# Patient Record
Sex: FEMALE | Race: WHITE | NOT HISPANIC OR LATINO | URBAN - METROPOLITAN AREA
[De-identification: names, ages, dates, MRNs, and addresses within clinical notes are randomized per-mention and may not be internally consistent; named-entity substitution may affect disease eponyms.]

---

## 2024-05-16 NOTE — PROGRESS NOTES
NEW PATIENT    HISTORY OF PRESENT ILLNESS   44 y.o. 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 Her right hip pain is worse than the left. She enjoys working in the yard outside of work. She states that her and her  are currently building a shop and working on an old truck. She states that she was cleaning her yard and pulling on a tree limb. She states that she was pulling hard and pivoting. She begin having extreme pain in the right hip the next day. She had pain trying to sit. She is unable to pull her leg up when the pain flares. She had a right hip arthrogram and had the report for review.     - mechanical symptoms, - instability          PAST MEDICAL HISTORY    History reviewed. No pertinent past medical history.    PAST SURGICAL HISTORY     History reviewed. No pertinent surgical history.    FAMILY HISTORY    No family history on file.    SOCIAL HISTORY    Social History     Socioeconomic History    Marital status:    Tobacco Use    Smoking status: Never    Smokeless tobacco: Never       MEDICATIONS    No current outpatient medications on file.    ALLERGIES     Review of patient's allergies indicates:  No Known Allergies      REVIEW OF SYSTEMS   Constitution: Negative. Negative for chills, fever and night sweats.   HENT: Negative for congestion and headaches.    Eyes: Negative for blurred vision, left vision loss and right vision loss.   Cardiovascular: Negative for chest pain and syncope.   Respiratory: Negative for cough and shortness of breath.    Endocrine: Negative for polydipsia, polyphagia and polyuria.   Hematologic/Lymphatic: Negative for bleeding problem. Does not bruise/bleed easily.   Skin: Negative for dry skin, itching and rash.   Musculoskeletal: Negative for falls. Positive for bilateral hip pain.  Gastrointestinal: Negative for abdominal pain and bowel incontinence.   Genitourinary: Negative for bladder incontinence and nocturia.  "  Neurological: Negative for disturbances in coordination, loss of balance and seizures.   Psychiatric/Behavioral: Negative for depression. The patient does not have insomnia.    Allergic/Immunologic: Negative for hives and persistent infections.     PHYSICAL EXAMINATION    Vitals: /87   Pulse 72   Ht 5' 11.5" (1.816 m)     General: The patient appears active and healthy with no apparent physical problems.  No disturbance of mood or affect is demonstrated. Alert and Oriented.    HEENT: Eyes normal, pupils equally round, nose normal.    Resp: Equal and symmetrical chest rises. No wheezing    CV: Regular rate    Neck: Supple; nonpainful range of motion.    Extremities: no cyanosis, clubbing, edema, or diffuse swelling.  Palpable pulses, good capillary refill of the digits.  No coolness, discoloration, edema or obvious varicosities.    Skin: no lesions noted.    Lymphatic: no detected adenopathy in the upper or lower extremities.    Neurologic: normal mental status, normal reflexes, normal gait and balance.  Patient is alert and oriented to person, place and time.  No flaccidity or spasticity is noted.  No motor or sensory deficits are noted.  Light touch is intact    Orthopaedic: Hip Exam- RIGHT    Inspection: Normal skin color and appearance, no ecchymosis, no swelling, and no scars.  Pelvis is level  without tilt. Leg lengths are equal.     Palpation: There is no tenderness of the anterior superior iliac spine, iliac crest, pubic symphysis, posterior superior iliac spine, sacroiliac joint, and greater trochanter.      ROM:   Flexion 100°, extension 20°, adduction 20°, abduction 40°, internal rotation 30°, external rotation 45°.  Pain is absent with ROM testing.  + fadir    Motor:   Muscle testing is 5/5 hip flexors, 5/5 extensors, 5/5 abductors and 5/5 adductors.      Neuro:   Sensation is normal in anterior, lateral and posterior femoral cutaneous nerve distribution.  Negative Trendelenburg Test. Reflexes " are 2/2 knee and    2/2 ankle.  Straight  leg raise is negative.    Vascular: 2+ pedal pulses, brisk cap refill less than 2 sec.    Generalized hyperlaxity.    IMAGING    X-Ray Hips Bilateral 2 View Incl AP Pelvis  Narrative: EXAMINATION:  XR HIPS BILATERAL 2 VIEW INCL AP PELVIS    CLINICAL HISTORY:  Pain in right hip    TECHNIQUE:  AP view of the pelvis and frogleg lateral views of both hips were performed.    COMPARISON:  None.    FINDINGS:  No acute fractures.  Intact visualized lower lumbar spine and right and left SI joints.  No definite narrowing of the right or left hip joint spaces.  Bilateral acetabular roof spurring.  Preserved right and left femoral head contours.  3-4 mm well-circumscribed lucent lesion suggested in the left greater trochanteric region should relate to small cyst.  5-6 mm well-circumscribed lucent lesion involving the base of the left femoral neck likely small cyst.  If there is clinical concern that these well-circumscribed subcentimeter lucent lesions could represent other pathology, could further evaluate with cross-sectional imaging.  Impression: As above    Electronically signed by: Samm Connors  Date:    05/17/2024  Time:    13:39        IMPRESSION       ICD-10-CM ICD-9-CM   1. Tear of left acetabular labrum, initial encounter  S73.192A 843.8   2. Tear of right acetabular labrum, initial encounter  S73.191A 843.8   3. Right hip pain  M25.551 719.45       MEDICATIONS PRESCRIBED      None    RECOMMENDATIONS     Physical therapy  RTC in 2 weeks with MRI images  We discussed possible injection options.  I recommended against PRP at this time until we had a firm diagnosis.  I also discussed potentially a diagnostic intra-articular hip injection.  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  However she is asymptomatic in the left hip at this time.  Her symptoms are primarily in the right hip.  She has never had  pre-existing issues or injuries prior.  I recommended a conservative approach with a core strengthening program 1st followed by potential hip injection.  I did recommend anti-inflammatories however she declined and wanted to start 1st with physical therapy.      All questions were answered, pt will contact us for questions or concerns in the interim.

## 2024-05-17 ENCOUNTER — OFFICE VISIT (OUTPATIENT)
Dept: SPORTS MEDICINE | Facility: CLINIC | Age: 44
End: 2024-05-17
Payer: COMMERCIAL

## 2024-05-17 ENCOUNTER — HOSPITAL ENCOUNTER (OUTPATIENT)
Dept: RADIOLOGY | Facility: HOSPITAL | Age: 44
Discharge: HOME | End: 2024-05-17
Attending: ORTHOPAEDIC SURGERY
Payer: COMMERCIAL

## 2024-05-17 VITALS — HEIGHT: 72 IN | DIASTOLIC BLOOD PRESSURE: 87 MMHG | SYSTOLIC BLOOD PRESSURE: 127 MMHG | HEART RATE: 72 BPM

## 2024-05-17 DIAGNOSIS — M25.551 RIGHT HIP PAIN: ICD-10-CM

## 2024-05-17 DIAGNOSIS — S73.191A TEAR OF RIGHT ACETABULAR LABRUM, INITIAL ENCOUNTER: ICD-10-CM

## 2024-05-17 DIAGNOSIS — S73.192A TEAR OF LEFT ACETABULAR LABRUM, INITIAL ENCOUNTER: Primary | ICD-10-CM

## 2024-05-17 PROCEDURE — 3074F SYST BP LT 130 MM HG: CPT | Mod: CPTII,S$GLB,, | Performed by: ORTHOPAEDIC SURGERY

## 2024-05-17 PROCEDURE — 73521 X-RAY EXAM HIPS BI 2 VIEWS: CPT | Mod: TC

## 2024-05-17 PROCEDURE — 99204 OFFICE O/P NEW MOD 45 MIN: CPT | Mod: S$GLB,,, | Performed by: ORTHOPAEDIC SURGERY

## 2024-05-17 PROCEDURE — 73521 X-RAY EXAM HIPS BI 2 VIEWS: CPT | Mod: 26,,, | Performed by: RADIOLOGY

## 2024-05-17 PROCEDURE — 3079F DIAST BP 80-89 MM HG: CPT | Mod: CPTII,S$GLB,, | Performed by: ORTHOPAEDIC SURGERY

## 2024-05-17 PROCEDURE — 99999 PR PBB SHADOW E&M-NEW PATIENT-LVL III: CPT | Mod: PBBFAC,,, | Performed by: ORTHOPAEDIC SURGERY

## 2024-05-22 ENCOUNTER — PATIENT MESSAGE (OUTPATIENT)
Dept: SPORTS MEDICINE | Facility: CLINIC | Age: 44
End: 2024-05-22
Payer: COMMERCIAL

## 2024-05-28 ENCOUNTER — CLINICAL SUPPORT (OUTPATIENT)
Dept: REHABILITATION | Facility: HOSPITAL | Age: 44
End: 2024-05-28
Attending: ORTHOPAEDIC SURGERY
Payer: COMMERCIAL

## 2024-05-28 DIAGNOSIS — M25.551 RIGHT HIP PAIN: ICD-10-CM

## 2024-05-28 PROCEDURE — 97110 THERAPEUTIC EXERCISES: CPT

## 2024-05-28 PROCEDURE — 97161 PT EVAL LOW COMPLEX 20 MIN: CPT

## 2024-05-28 NOTE — PLAN OF CARE
[facility]   Physical Therapy Initial Evaluation      Name: Kary Marquez  Clinic Number: [id number]    Therapy Diagnosis:   Encounter Diagnosis   Name Primary?    Right hip pain         Physician: Kamla Moreira MD    Physician Orders: PT Eval and Treat   Medical Diagnosis from Referral: Right hip pain  Evaluation Date: 5/28/2024  Authorization Period Expiration: 05/29/2024 - 12/31/2024  Plan of Care Expiration: 8/28/24  Progress Note Due: 6/28/24  Visit # / Visits authorized: 1 / 1    FOTO: 22%  FOTO 2:   FOTO 3:  DC FOTO @: 56%      Precautions: Standard     Time In: 200 pm   Time Out: 300 pm   Total Appointment Time (timed & untimed codes): 60 minutes    Subjective     Date of onset: right before christmas 2023    History of current condition - Kary reports: severe right hip pain that would get so bad she couldn't sit down or put shoes on, etc. She does note that she could have onset the worsening by doing some gardening in which she was dragging tree limps, which is along the lines of when the pain worsened. Over the years there has been stiffness in joints throughout. The hip pain is inhibiting walking, movement, adls, and exercise. Dr. Moreira told her to hold off running but that hip replacements were in the future. He says more so DJD but does feel like there are labrum tears. He recommended cycling, strengthening, and doing weights at this point with conservative PT. Follow up with  on 7th where they will discuss MRI. ADLs are difficult and painful. Pain is full [place] of hip joint. Has been doing about 20 min of low level exercise.     Falls: none    Imaging:  XRAY (B HIP): No acute fractures. Intact visualized lower lumbar spine and right and left SI joints. No definite narrowing of the right or left hip joint spaces. Bilateral acetabular roof spurring. Preserved right and left femoral head contours. 3-4 mm well-circumscribed lucent lesion suggested in the  left greater trochanteric region should relate to small cyst. 5-6 mm well-circumscribed lucent lesion involving the base of the left femoral neck likely small cyst. If there is clinical concern that these well-circumscribed subcentimeter lucent lesions could represent other pathology, could further evaluate with cross-sectional imaging.     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    Surgical History:   Kary Marquez  has no past surgical history on file.    Medications:   Kary currently has no medications in their medication list.    Allergies:   Review of patient's allergies indicates:  No Known Allergies     Objective      Observation: tall 44 year old female    Hip Range of Motion:   Right active Left active    Flexion 98 122   Abduction 65 65   Extension 20 20   Ext. Rotation 40 26   Int. Rotation 19 32       Lower Extremity Strength   Right LE Left LE   Quadriceps: 4/5 4+/5   Hamstrings: 4+/5 4+/5   Iliopsoas: 4+/5 4+/5   Hip extension:  3+/5 3+/5   PGM: 3+/5 4/5   Hip ER: 3/5 4/5   Hip IR: 3/5 3/5       Special Tests:   FABERs:  +   AMY:+  Hip Scour: +  Slump: -    Core lift off strength for 30 sec: able but shaking    Joint Mobility: lateral/inferior glide hypomobile    Palpation: not tender to touch    Edema: none noticeable      Limitation/Restriction for FOTO HIP Survey    Therapist reviewed FOTO scores for Kary Marquez on 5/28/2024.   FOTO documents entered into [employer] - see Media section.    Limitation Score: 22%    "      Treatment     Total Treatment time (time-based codes) separate from Evaluation: 10 minutes     Kary received the treatments listed below:      Plank / side plank 1 x 30"  Reverse clam 10x  TRA march 10x  Prone alt LE 10x    Patient Education and Home Exercises     Education provided:   - HEP    Written Home Exercises Provided: yes. Exercises were reviewed and Kary was able to demonstrate them prior to the end of the session.  Kary demonstrated good  understanding of the education provided. See EMR under Patient Instructions for exercises provided during therapy sessions.    Assessment     Kary is a 44 y.o. female referred to outpatient Physical Therapy with a medical diagnosis of Right hip pain. Patient presents with sx/symptoms of labral pathology in R>L hip. She has had MRI but results are not in chart. Pt demonstrating decreased hip ROM, joint mobility, and pain. Pt has f/u with Dr. Moreira next Friday the 7th.    Patient prognosis is Good.   Patient will benefit from skilled outpatient Physical Therapy to address the deficits stated above and in the chart below, provide patient /family education, and to maximize patientt's level of independence.     Plan of care discussed with patient: Yes  Patient's spiritual, cultural and educational needs considered and patient is agreeable to the plan of care and goals as stated below:     Anticipated Barriers for therapy: (none)    Medical Necessity is demonstrated by the following  History  Co-morbidities and personal factors that may impact the plan of care [x] LOW: no personal factors / co-morbidities  [] MODERATE: 1-2 personal factors / co-morbidities  [] HIGH: 3+ personal factors / co-morbidities    Moderate / High Support Documentation:   Co-morbidities affecting plan of care: see chart    Personal Factors:   no deficits     Examination  Body Structures and Functions, activity limitations and participation restrictions that may impact the plan " of care [x] LOW: addressing 1-2 elements  [] MODERATE: 3+ elements  [] HIGH: 4+ elements (please support below)    Moderate / High Support Documentation:      Clinical Presentation [x] LOW: stable  [] MODERATE: Evolving  [] HIGH: Unstable     Decision Making/ Complexity Score: low       Goals:  Short Term Goals:  4 weeks  1.Report decreased R hip pain  < / =  8/10  to increase tolerance for adls  2. Increase ROM by R HIP degrees where limited in order to perform ADLs without difficulty.  3. Increase strength by 1/3 MMT grade in R HIP  to increase tolerance for ADL and work activities.  4. Pt to tolerate HEP to improve ROM and independence with ADL's    Long Term Goals: 8 weeks  1.Report decreased R HIP pain < / = 2/10  to increase tolerance for ADLS  2.Patient goal: walk pain-free  3.Increase strength to 4+/5 in  RLE  to increase tolerance for ADL and work activities.  4. Pt will report at CJ level (20-40% impaired) on LEFS  to demonstrate increase in LE function with every day tasks.       Plan     Plan of care Certification: 5/28/2024 to 8/28/24.    Outpatient Physical Therapy 2 times weekly for 10 weeks to include the following interventions: Gait Training, Manual Therapy, Moist Heat/ Ice, Neuromuscular Re-ed, Patient Education, Self Care, Therapeutic Activities, Therapeutic Exercise,  dry needling.     Nan Damon, PT

## 2024-06-04 NOTE — PROGRESS NOTES
"ESTABLISHED PATIENT    History 6/7/2024:  Kary returns today for follow-up MR arthrogram of her hip.    History 5/17/2024:  44 y.o. 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 She enjoys working in the yard outside of work. She states that her and her  are currently building a shop and working on an old truck. She states that she was cleaning her yard and pulling on a tree limb. She states that she was pulling hard and pivoting. She begin having extreme pain in the right hip the next day. She had pain trying to sit. She is unable to pull her leg up when the pain flares. She had a right hip arthrogram and had the report for review.     - mechanical symptoms, - instability    REVIEW OF SYSTEMS   Constitution: Negative. Negative for chills, fever and night sweats.   HENT: Negative for congestion and headaches.    Eyes: Negative for blurred vision, left vision loss and right vision loss.   Cardiovascular: Negative for chest pain and syncope.   Respiratory: Negative for cough and shortness of breath.    Endocrine: Negative for polydipsia, polyphagia and polyuria.   Hematologic/Lymphatic: Negative for bleeding problem. Does not bruise/bleed easily.   Skin: Negative for dry skin, itching and rash.   Musculoskeletal: Negative for falls. Positive for bilateral hip pain.  Gastrointestinal: Negative for abdominal pain and bowel incontinence.   Genitourinary: Negative for bladder incontinence and nocturia.   Neurological: Negative for disturbances in coordination, loss of balance and seizures.   Psychiatric/Behavioral: Negative for depression. The patient does not have insomnia.    Allergic/Immunologic: Negative for hives and persistent infections.     PHYSICAL EXAMINATION    Vitals: /84   Pulse 80   Ht 5' 11" (1.803 m)   Wt 72.1 kg (159 lb)   BMI 22.18 kg/m²     General: The patient appears active and healthy with no " apparent physical problems.  No disturbance of mood or affect is demonstrated. Alert and Oriented.    HEENT: Eyes normal, pupils equally round, nose normal.    Resp: Equal and symmetrical chest rises. No wheezing    CV: Regular rate    Neck: Supple; nonpainful range of motion.    Extremities: no cyanosis, clubbing, edema, or diffuse swelling.  Palpable pulses, good capillary refill of the digits.  No coolness, discoloration, edema or obvious varicosities.    Skin: no lesions noted.    Lymphatic: no detected adenopathy in the upper or lower extremities.    Neurologic: normal mental status, normal reflexes, normal gait and balance.  Patient is alert and oriented to person, place and time.  No flaccidity or spasticity is noted.  No motor or sensory deficits are noted.  Light touch is intact    Orthopaedic: Hip Exam- RIGHT    Inspection: Normal skin color and appearance, no ecchymosis, no swelling, and no scars.  Pelvis is level  without tilt. Leg lengths are equal.     Palpation: There is no tenderness of the anterior superior iliac spine, iliac crest, pubic symphysis, posterior superior iliac spine, sacroiliac joint, and greater trochanter.      ROM:   Flexion 100°, extension 20°, adduction 20°, abduction 40°, internal rotation 30°, external rotation 45°.  Pain is absent with ROM testing.  + fadir    Motor:   Muscle testing is 5/5 hip flexors, 5/5 extensors, 5/5 abductors and 5/5 adductors.      Neuro:   Sensation is normal in anterior, lateral and posterior femoral cutaneous nerve distribution.  Negative Trendelenburg Test. Reflexes are 2/2 knee and    2/2 ankle.  Straight  leg raise is negative.    Vascular: 2+ pedal pulses, brisk cap refill less than 2 sec.    Generalized hyperlaxity.    IMAGING    X-Ray Hips Bilateral 2 View Incl AP Pelvis  Narrative: EXAMINATION:  XR HIPS BILATERAL 2 VIEW INCL AP PELVIS    CLINICAL HISTORY:  Pain in right hip    TECHNIQUE:  AP view of the pelvis and frogleg lateral views of both  hips were performed.    COMPARISON:  None.    FINDINGS:  No acute fractures.  Intact visualized lower lumbar spine and right and left SI joints.  No definite narrowing of the right or left hip joint spaces.  Bilateral acetabular roof spurring.  Preserved right and left femoral head contours.  3-4 mm well-circumscribed lucent lesion suggested in the left greater trochanteric region should relate to small cyst.  5-6 mm well-circumscribed lucent lesion involving the base of the left femoral neck likely small cyst.  If there is clinical concern that these well-circumscribed subcentimeter lucent lesions could represent other pathology, could further evaluate with cross-sectional imaging.  Impression: As above    Electronically signed by: Samm Connors  Date:    05/17/2024  Time:    13:39        IMPRESSION       ICD-10-CM ICD-9-CM   1. Tear of right acetabular labrum, initial encounter  S73.191A 843.8   2. Tear of left acetabular labrum, initial encounter  S73.192A 843.8         MEDICATIONS PRESCRIBED      None    RECOMMENDATIONS     Referr to Dr. Villalta for possible hip arthroscopy  We discussed surgical and nonsurgical treatment.  I recommended a possible surgical consultation.  She is still in physical therapy which I have recommended her to continue.      All questions were answered, pt will contact us for questions or concerns in the interim.

## 2024-06-06 ENCOUNTER — CLINICAL SUPPORT (OUTPATIENT)
Dept: REHABILITATION | Facility: HOSPITAL | Age: 44
End: 2024-06-06
Payer: COMMERCIAL

## 2024-06-06 DIAGNOSIS — M25.551 RIGHT HIP PAIN: Primary | ICD-10-CM

## 2024-06-06 PROCEDURE — 97112 NEUROMUSCULAR REEDUCATION: CPT

## 2024-06-06 PROCEDURE — 97140 MANUAL THERAPY 1/> REGIONS: CPT

## 2024-06-06 NOTE — PROGRESS NOTES
"[facility]   Physical Therapy Treatment Note      Name: Kary Marquez  Clinic Number: [id number]    Therapy Diagnosis: No diagnosis found.  Physician: Kamla Moreira MD    Visit Date: 6/6/2024    Physician: Kamla Moreira MD     Physician Orders: PT Eval and Treat             Medical Diagnosis from Referral: Right hip pain  Evaluation Date: 5/28/2024  Authorization Period Expiration: 05/29/2024 - 12/31/2024  Plan of Care Expiration: 8/28/24  Progress Note Due: 6/28/24  Visit # / Visits authorized: 1 / 1     FOTO: 22%  FOTO 2:   FOTO 3:  DC FOTO @: 56%        Precautions: Standard     PTA Visit #: 0/5     Time In: 305 pm   Time Out: 400 pm   Total Billable Time: 55 minutes    Subjective     Pt reports: during exercises she feels her joints rather than the muscles. Cannot do exercises in the morning due to severe stiffness. It takes her until the end of day to warm up and be able to get through them. Nothing is worse. In high pain today 7/10. Sees dr. Meijer tomorrow to review results.     She was compliant with home exercise program.  Response to previous treatment: no worse  Functional change: no worsening     Pain: 7/10  Location: R hip    Objective      Objective Measures updated at progress report unless specified.     Hip Range of Motion:    Right active Left active    Flexion 98 122   Abduction 65 65   Extension 20 20   Ext. Rotation 40 26   Int. Rotation 19 32       Treatment     Kary received the treatments listed below:      Kary participated in neuromuscular re-education activities to improve: Balance, Coordination, Kinesthetic, Sense, Proprioception and Posture for 45 minutes. The following activities were included:  HSystem hip hinge kneeling 30x  B Bentover hip ext 3 x 10  B modified wedge bridge 3" x 30  B hip add/abd ball and band 3" x 40  Isometric seated IR 3" x 30  Isometric IR/ER 3" orange belt 3 x 8    Kary received the following manual " therapy techniques: Joint mobilizations, Manual traction, Myofacial release, Soft tissue Mobilization, Friction Massage and Functional Dry Needling were applied for 10 minutes, including:  Long and short axis distraction of R hip  Lateral / inferior glides of R hip    Patient Education and Home Exercises       Education provided:   - continue HEP     Written Home Exercises Provided: Patient instructed to cont prior HEP. Exercises were reviewed and Kary was able to demonstrate them prior to the end of the session.  Kary demonstrated good  understanding of the education provided. See EMR under Patient Instructions for exercises provided during therapy sessions    Assessment     Pt in 7/10 pain today.  She had some joint pain with HEP - we made modifications and found activation with isometrics was better. Updated HEP issued band. Going to see Dr. Moreira for MRI Review tomorrow morning and will stop by afterwards to discuss POC.    Kary Is progressing well towards her goals.   Pt prognosis is Fair.     Pt will continue to benefit from skilled outpatient physical therapy to address the deficits listed in the problem list box on initial evaluation, provide pt/family education and to maximize pt's level of independence in the home and community environment.     Pt's spiritual, cultural and educational needs considered and pt agreeable to plan of care and goals.     Anticipated barriers to physical therapy: none    Goals:   Short Term Goals:  4 weeks  1.Report decreased R hip pain  < / =  8/10  to increase tolerance for adls  2. Increase ROM by R HIP degrees where limited in order to perform ADLs without difficulty.  3. Increase strength by 1/3 MMT grade in R HIP  to increase tolerance for ADL and work activities.  4. Pt to tolerate HEP to improve ROM and independence with ADL's     Long Term Goals: 8 weeks  1.Report decreased R HIP pain < / = 2/10  to increase tolerance for ADLS  2.Patient goal: walk  pain-free  3.Increase strength to 4+/5 in  RLE  to increase tolerance for ADL and work activities.  4. Pt will report at CJ level (20-40% impaired) on LEFS  to demonstrate increase in LE function with every day tasks.     Plan     Continue with PT DIANA Damon PT

## 2024-06-07 ENCOUNTER — OFFICE VISIT (OUTPATIENT)
Dept: SPORTS MEDICINE | Facility: CLINIC | Age: 44
End: 2024-06-07
Payer: COMMERCIAL

## 2024-06-07 VITALS
DIASTOLIC BLOOD PRESSURE: 84 MMHG | HEART RATE: 80 BPM | WEIGHT: 159 LBS | SYSTOLIC BLOOD PRESSURE: 123 MMHG | HEIGHT: 71 IN | BODY MASS INDEX: 22.26 KG/M2

## 2024-06-07 DIAGNOSIS — S73.192A TEAR OF LEFT ACETABULAR LABRUM, INITIAL ENCOUNTER: ICD-10-CM

## 2024-06-07 DIAGNOSIS — S73.191A TEAR OF RIGHT ACETABULAR LABRUM, INITIAL ENCOUNTER: Primary | ICD-10-CM

## 2024-06-07 PROCEDURE — 3079F DIAST BP 80-89 MM HG: CPT | Mod: CPTII,S$GLB,, | Performed by: ORTHOPAEDIC SURGERY

## 2024-06-07 PROCEDURE — 3008F BODY MASS INDEX DOCD: CPT | Mod: CPTII,S$GLB,, | Performed by: ORTHOPAEDIC SURGERY

## 2024-06-07 PROCEDURE — 99214 OFFICE O/P EST MOD 30 MIN: CPT | Mod: S$GLB,,, | Performed by: ORTHOPAEDIC SURGERY

## 2024-06-07 PROCEDURE — 1159F MED LIST DOCD IN RCRD: CPT | Mod: CPTII,S$GLB,, | Performed by: ORTHOPAEDIC SURGERY

## 2024-06-07 PROCEDURE — 99999 PR PBB SHADOW E&M-EST. PATIENT-LVL III: CPT | Mod: PBBFAC,,, | Performed by: ORTHOPAEDIC SURGERY

## 2024-06-07 PROCEDURE — 3074F SYST BP LT 130 MM HG: CPT | Mod: CPTII,S$GLB,, | Performed by: ORTHOPAEDIC SURGERY

## 2024-06-11 ENCOUNTER — CLINICAL SUPPORT (OUTPATIENT)
Dept: REHABILITATION | Facility: HOSPITAL | Age: 44
End: 2024-06-11
Payer: COMMERCIAL

## 2024-06-11 DIAGNOSIS — M25.551 RIGHT HIP PAIN: Primary | ICD-10-CM

## 2024-06-11 PROCEDURE — 97530 THERAPEUTIC ACTIVITIES: CPT

## 2024-06-11 PROCEDURE — 97112 NEUROMUSCULAR REEDUCATION: CPT

## 2024-06-11 PROCEDURE — 97140 MANUAL THERAPY 1/> REGIONS: CPT

## 2024-06-11 NOTE — PROGRESS NOTES
"[facility]   Physical Therapy Treatment Note      Name: Kary Marquez  Clinic Number: [id number]    Therapy Diagnosis: No diagnosis found.  Physician: Kamla Moreira MD    Visit Date: 6/11/2024    Physician: Kamla Moreira MD     Physician Orders: PT Eval and Treat             Medical Diagnosis from Referral: Right hip pain  Evaluation Date: 5/28/2024  Authorization Period Expiration: 05/29/2024 - 12/31/2024  Plan of Care Expiration: 8/28/24  Progress Note Due: 6/28/24  Visit # / Visits authorized: 2 / 20     FOTO: 22%  FOTO 2:   FOTO 3:  DC FOTO @: 56%        Precautions: Standard     PTA Visit #: 0/5     Time In: 900 am   Time Out: 1000 am   Total Billable Time: 60 minutes    Subjective     Pt reports[place] also had time to relax so not in as much pain. Not at a 7 today. She is to see Dr. Villalta in 1 week regarding her MRI and options based on that.     She was compliant with home exercise program.  Response to previous treatment: no worse  Functional change: no worsening     Pain: not stated/10  Location: R hip    Objective      Objective Measures updated at progress report unless specified.     Hip Range of Motion:    Right active Left active    Flexion 98 122   Abduction 65 65   Extension 20 20   Ext. Rotation 40 26   Int. Rotation 19 32       Treatment     Kary received the treatments listed below:      Kary participated in neuromuscular re-education activities to improve: Balance, Coordination, Kinesthetic, Sense, Proprioception and Posture for 35 minutes. The following activities were included:  [place] hip hinge kneeling 30x  R Bentover hip ext 3 x 10  B modified wedge bridge 3" x 30  B hip add/abd ball and band 3" x 40  Weighted R hip abd 3# 3 x 10     Not Today:   Isometric seated IR 3" x 30  Isometric IR/ER 3" orange belt 3 x 8    Kary received the following manual therapy techniques: Joint mobilizations, Manual " traction, Myofacial release, Soft tissue Mobilization, Friction Massage and Functional Dry Needling were applied for 10 minutes, including:  Long and short axis distraction of R hip  Lateral / inferior glides of R hip      Kary participated in dynamic functional therapeutic activities to improve functional performance for 15 minutes, including:  Knee ext hammer curl 5# 4 x 8  SL leg press 60# 3 x 8  Banded mod squats BTB at knees 3 x 8      Patient Education and Home Exercises       Education provided:   - continue HEP     Written Home Exercises Provided: Patient instructed to cont prior HEP. Exercises were reviewed and Kary was able to demonstrate them prior to the end of the session.  Kary demonstrated good  understanding of the education provided. See EMR under Patient Instructions for exercises provided during therapy sessions    Assessment     Pt with improved symptoms. Responding well to isometrics without symptoms. We were able to progress a bit today and focused on strengthening around hip joint for improved stability. Sees Dr. Villalta in 1 week for consult.    Kary Is progressing well towards her goals.   Pt prognosis is Fair.     Pt will continue to benefit from skilled outpatient physical therapy to address the deficits listed in the problem list box on initial evaluation, provide pt/family education and to maximize pt's level of independence in the home and community environment.     Pt's spiritual, cultural and educational needs considered and pt agreeable to plan of care and goals.     Anticipated barriers to physical therapy: none    Goals:   Short Term Goals:  4 weeks  1.Report decreased R hip pain  < / =  8/10  to increase tolerance for adls  2. Increase ROM by R HIP degrees where limited in order to perform ADLs without difficulty.  3. Increase strength by 1/3 MMT grade in R HIP  to increase tolerance for ADL and work activities.  4. Pt to tolerate HEP to improve ROM and  independence with ADL's     Long Term Goals: 8 weeks  1.Report decreased R HIP pain < / = 2/10  to increase tolerance for ADLS  2.Patient goal: walk pain-free  3.Increase strength to 4+/5 in  RLE  to increase tolerance for ADL and work activities.  4. Pt will report at CJ level (20-40% impaired) on LEFS  to demonstrate increase in LE function with every day tasks.     Plan     Continue with PT DIANA Damon, PT

## 2024-06-18 ENCOUNTER — HOSPITAL ENCOUNTER (OUTPATIENT)
Dept: RADIOLOGY | Facility: HOSPITAL | Age: 44
Discharge: HOME | End: 2024-06-18
Attending: STUDENT IN AN ORGANIZED HEALTH CARE EDUCATION/TRAINING PROGRAM
Payer: COMMERCIAL

## 2024-06-18 ENCOUNTER — OFFICE VISIT (OUTPATIENT)
Dept: SPORTS MEDICINE | Facility: CLINIC | Age: 44
End: 2024-06-18
Payer: COMMERCIAL

## 2024-06-18 VITALS
HEART RATE: 67 BPM | SYSTOLIC BLOOD PRESSURE: 107 MMHG | DIASTOLIC BLOOD PRESSURE: 73 MMHG | HEIGHT: 71 IN | BODY MASS INDEX: 22.61 KG/M2 | WEIGHT: 161.5 LBS

## 2024-06-18 DIAGNOSIS — M25.852 FEMOROACETABULAR IMPINGEMENT OF BOTH HIPS: Primary | ICD-10-CM

## 2024-06-18 DIAGNOSIS — S73.191A ACETABULAR LABRUM TEAR, RIGHT, INITIAL ENCOUNTER: ICD-10-CM

## 2024-06-18 DIAGNOSIS — M25.851 FEMOROACETABULAR IMPINGEMENT OF BOTH HIPS: Primary | ICD-10-CM

## 2024-06-18 DIAGNOSIS — M25.559 HIP PAIN, UNSPECIFIED LATERALITY: ICD-10-CM

## 2024-06-18 PROCEDURE — 3074F SYST BP LT 130 MM HG: CPT | Mod: CPTII,S$GLB,, | Performed by: STUDENT IN AN ORGANIZED HEALTH CARE EDUCATION/TRAINING PROGRAM

## 2024-06-18 PROCEDURE — 73521 X-RAY EXAM HIPS BI 2 VIEWS: CPT | Mod: TC

## 2024-06-18 PROCEDURE — 99999 PR PBB SHADOW E&M-EST. PATIENT-LVL III: CPT | Mod: PBBFAC,,, | Performed by: STUDENT IN AN ORGANIZED HEALTH CARE EDUCATION/TRAINING PROGRAM

## 2024-06-18 PROCEDURE — 1159F MED LIST DOCD IN RCRD: CPT | Mod: CPTII,S$GLB,, | Performed by: STUDENT IN AN ORGANIZED HEALTH CARE EDUCATION/TRAINING PROGRAM

## 2024-06-18 PROCEDURE — 99214 OFFICE O/P EST MOD 30 MIN: CPT | Mod: S$GLB,,, | Performed by: STUDENT IN AN ORGANIZED HEALTH CARE EDUCATION/TRAINING PROGRAM

## 2024-06-18 PROCEDURE — 1160F RVW MEDS BY RX/DR IN RCRD: CPT | Mod: CPTII,S$GLB,, | Performed by: STUDENT IN AN ORGANIZED HEALTH CARE EDUCATION/TRAINING PROGRAM

## 2024-06-18 PROCEDURE — 3078F DIAST BP <80 MM HG: CPT | Mod: CPTII,S$GLB,, | Performed by: STUDENT IN AN ORGANIZED HEALTH CARE EDUCATION/TRAINING PROGRAM

## 2024-06-18 PROCEDURE — 73521 X-RAY EXAM HIPS BI 2 VIEWS: CPT | Mod: 26,,, | Performed by: RADIOLOGY

## 2024-06-18 PROCEDURE — 3008F BODY MASS INDEX DOCD: CPT | Mod: CPTII,S$GLB,, | Performed by: STUDENT IN AN ORGANIZED HEALTH CARE EDUCATION/TRAINING PROGRAM

## 2024-06-18 NOTE — PROGRESS NOTES
Subjective:          Chief Complaint: Kary Marquez is a 44 y.o. female who had concerns including Pain of the Right Hip and Pain of the Left Hip.    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 She notes that her right is worse then the left.   She notes that her pain started about 6 months ago with no specific injury. She locates her pain as anterior, lateral, and posterior surrounding the hip. She complains of increased pain with activity and long periods of sitting.  She is difficulty getting in and out of a car going from sit to stand position due to the pain.  At times, the pain reaches a 7/10.  She has begun some formal physical therapy and has been to 3 sessions.      History reviewed. No pertinent past medical history.    No current outpatient medications on file prior to visit.     No current facility-administered medications on file prior to visit.       History reviewed. No pertinent surgical history.    No family history on file.    Social History     Socioeconomic History    Marital status:    Tobacco Use    Smoking status: Never    Smokeless tobacco: Never   Social History Narrative    ** Merged History Encounter **          Social Determinants of Health     Financial Resource Strain: Low Risk  (6/3/2024)    Overall Financial Resource Strain (CARDIA)     Difficulty of Paying Living Expenses: Not hard at all   Food Insecurity: No Food Insecurity (6/3/2024)    Hunger Vital Sign     Worried About Running Out of Food in the Last Year: Never true     Ran Out of Food in the Last Year: Never true   Physical Activity: Insufficiently Active (6/3/2024)    Exercise Vital Sign     Days of Exercise per Week: 5 days     Minutes of Exercise per Session: 20 min   Stress: No Stress Concern Present (6/3/2024)    [employer] - Occupational Stress Questionnaire     Feeling of  Stress : Not at all   Housing Stability: Unknown (6/3/2024)    Housing Stability Vital Sign     Unable to Pay for Housing in the Last Year: No       Review of Systems   Constitutional: Negative.   HENT: Negative.     Eyes: Negative.    Cardiovascular: Negative.    Respiratory: Negative.     Endocrine: Negative.    Hematologic/Lymphatic: Negative.    Skin: Negative.    Musculoskeletal:  Positive for joint pain (bilateral hip). Negative for arthritis, falls, muscle cramps, muscle weakness, myalgias and stiffness.   Neurological: Negative.    Psychiatric/Behavioral: Negative.     Allergic/Immunologic: Negative.                    Objective:        General: Kary is well-developed, well-nourished, appears stated age, in no acute distress, alert and oriented to time, place and person.     General    Nursing note and vitals reviewed.  Constitutional: She is oriented to person, place, and time. She appears well-developed and well-nourished. No distress.   HENT:   Head: Normocephalic and atraumatic.   Nose: Nose normal.   Eyes: EOM are normal.   Cardiovascular:  Intact distal pulses.            Pulmonary/Chest: Effort normal. No respiratory distress.   Neurological: She is alert and oriented to person, place, and time.   Psychiatric: She has a normal mood and affect. Her behavior is normal. Judgment and thought content normal.           Right Knee Exam     Inspection   Alignment:  normal  Effusion: absent    Left Knee Exam     Inspection   Alignment:  normal  Effusion: absent    Right Hip Exam     Inspection   Scars: absent  Swelling: absent  Bruising: absent  No deformity of hip.  Quadriceps Atrophy:  Negative  Erythema: absent    Range of Motion   Extension:  0   Flexion:  100   External rotation:  60   Internal rotation:  10     Tests   Pain w/ forced internal rotation (KANE): absent  Pain w/ forced external rotation (FADIR): present  Niranjan: negative  Trendelenburg Test: negative  Circumduction test:  negative  Stinchfield test: positive  Log Roll: negative  Snapping Hip (internal): negative  Step-down test: negative    Other   Sensation: normal  Left Hip Exam     Inspection   Scars: absent  Swelling: absent  No deformity of hip.  Quadriceps Atrophy:  negative  Erythema: absent  Bruising: absent    Range of Motion   Extension:  0   Flexion:  110   External rotation:  60   Internal rotation: 20     Tests   Pain w/ forced internal rotation (KANE): absent  Pain w/ forced external rotation (FADIR): absent  Niranjan: negative  Trendelenburg Test: negative  Circumduction test: negative  Stinchfield test: negative  Log Roll: negative    Other   Sensation: normal          Muscle Strength   Right Lower Extremity   Hip Abduction: 5/5   Hip Adduction: 5/5   Hip Flexion: 4/5   Ankle Dorsiflexion:  5/5   Left Lower Extremity   Hip Abduction: 5/5   Hip Adduction: 5/5   Hip Flexion: 5/5   Ankle Dorsiflexion:  5/5     Vascular Exam     Right Pulses  Dorsalis Pedis:      2+  Posterior Tibial:      2+        Left Pulses  Dorsalis Pedis:      2+  Posterior Tibial:      2+        Capillary Refill  Left Hand: normal capillary refill        Edema  Right Upper Leg: absent  Left Upper Leg: absent      Imaging:   X-rays of the bilateral hips from 06/18/2024 personally viewed by me on that day these include AP pelvis, AP right hip, AP left hip, bilateral modified [place].  Large cam deformities bilaterally as well as pincer impingement with likely os acetabuli.      MR arthrogram of the right hip from 01/26/2024 at an outside institution uploaded and personally viewed by me 06/18/2024.  There is complex tearing of the acetabular labrum on the right hip include the anterior, superior, and posterior aspect.  There is calcification within the anterior and superior aspect of the labrum.  Cam impingement.  Cartilage preserved.        Assessment:     Kary Marquez is a 44 y.o. female With bilateral femoroacetabular impingement and right  labral tear  Encounter Diagnoses   Name Primary?    Hip pain, unspecified laterality     Femoroacetabular impingement of both hips Yes    Acetabular labrum tear, right, initial encounter           Plan:       Diagnosis treatment options with the patient all her questions were answered I showed her the x-rays and the MR arthrogram and reviewed the findings with her.  She is only just begun formal physical therapy for this.  I recommended we continue the hip, core, gluteal strengthening.  I did offer referral for an intra-articular corticosteroid injection to help with her hip pain.  This was declined at this time.  We will continue physical therapy she will return to clinic in 6-8 weeks.  If her symptoms worsen, we can consider intra-articular corticosteroid injection or surgical intervention would be hip arthroscopy with osteoplasty of the femur, acetabulum, and labral repair versus reconstruction.

## 2024-06-20 ENCOUNTER — CLINICAL SUPPORT (OUTPATIENT)
Dept: REHABILITATION | Facility: HOSPITAL | Age: 44
End: 2024-06-20
Payer: COMMERCIAL

## 2024-06-20 DIAGNOSIS — M25.551 RIGHT HIP PAIN: Primary | ICD-10-CM

## 2024-06-20 PROCEDURE — 97530 THERAPEUTIC ACTIVITIES: CPT

## 2024-06-20 PROCEDURE — 97140 MANUAL THERAPY 1/> REGIONS: CPT

## 2024-06-20 PROCEDURE — 97112 NEUROMUSCULAR REEDUCATION: CPT

## 2024-06-20 NOTE — PROGRESS NOTES
"[facility]   Physical Therapy Treatment Note      Name: Kary Marquez  Clinic Number: [id number]    Therapy Diagnosis: No diagnosis found.  Physician: Kamla Moreira MD    Visit Date: 6/20/2024    Physician: Kamla Moreira MD     Physician Orders: PT Eval and Treat             Medical Diagnosis from Referral: Right hip pain  Evaluation Date: 5/28/2024  Authorization Period Expiration: 05/29/2024 - 12/31/2024  Plan of Care Expiration: 8/28/24  Progress Note Due: 6/28/24  Visit # / Visits authorized: 3 / 20     FOTO: 22%  FOTO 2:   FOTO 3:  DC FOTO @: 56%        Precautions: Standard     PTA Visit #: 0/5     Time In: 400 pm   Time Out: 455 pm   Total Billable Time: 55 minutes    Subjective     Pt reports: saw Dr. Villalta recommended CSI/cont with PT for another 6-8 weeks. Pt is hurting today (7/10)    She was compliant with home exercise program.  Response to previous treatment: no worse  Functional change: no worsening     Pain: 7/10  Location: R hip    Objective      Objective Measures updated at progress report unless specified.     Hip Range of Motion:    Right active Left active    Flexion 98 122   Abduction 65 65   Extension 20 20   Ext. Rotation 40 26   Int. Rotation 19 32       Treatment     Kary received the treatments listed below:      Kary participated in neuromuscular re-education activities to improve: Balance, Coordination, Kinesthetic, Sense, Proprioception and Posture for 25 minutes. The following activities were included:  [place] hip hinge kneeling 20x  Bird dog L beginner 15x   LAQ nhan orange belt 10" x 15       Not Today:   Isometric seated IR 3" x 30  Isometric IR/ER 3" orange belt 3 x 8  B hip add/abd ball and band 3" x 40  Weighted R hip abd 3# 3 x 10  R Bentover hip ext 3 x 10    Kary received the following manual therapy techniques: Joint mobilizations, Manual traction, Myofacial release, Soft tissue Mobilization, Friction Massage " "and Functional Dry Needling were applied for 15 minutes, including:  Long and short axis distraction of R hip  Lateral / inferior glides of R hip      Kary participated in dynamic functional therapeutic activities to improve functional performance for 15 minutes, including:  Knee ext hammer curl 5#  ~ crepitus   -- > ALIDA quad orange belt 10" x 10  SL leg press 40# 4 x 8    Patient Education and Home Exercises       Education provided:   - continue HEP     Written Home Exercises Provided: Patient instructed to cont prior HEP. Exercises were reviewed and Kary was able to demonstrate them prior to the end of the session.  Kary demonstrated good  understanding of the education provided. See EMR under Patient Instructions for exercises provided during therapy sessions    Assessment     Dr. Villalta wants her to continue with PT. She was displaying some crepitus with LAQs today so we modified to extension isometrics for patellar tracking, likely secondary to lumbopelvic and glute/quad weakness. Updated HEP.    Kary Is progressing well towards her goals.   Pt prognosis is Fair.     Pt will continue to benefit from skilled outpatient physical therapy to address the deficits listed in the problem list box on initial evaluation, provide pt/family education and to maximize pt's level of independence in the home and community environment.     Pt's spiritual, cultural and educational needs considered and pt agreeable to plan of care and goals.     Anticipated barriers to physical therapy: none    Goals:   Short Term Goals:  4 weeks  1.Report decreased R hip pain  < / =  8/10  to increase tolerance for adls  2. Increase ROM by R HIP degrees where limited in order to perform ADLs without difficulty.  3. Increase strength by 1/3 MMT grade in R HIP  to increase tolerance for ADL and work activities.  4. Pt to tolerate HEP to improve ROM and independence with ADL's     Long Term Goals: 8 weeks  1.Report decreased " R HIP pain < / = 2/10  to increase tolerance for ADLS  2.Patient goal: walk pain-free  3.Increase strength to 4+/5 in  RLE  to increase tolerance for ADL and work activities.  4. Pt will report at CJ level (20-40% impaired) on LEFS  to demonstrate increase in LE function with every day tasks.     Plan     Continue with PT DIANA Damon, PT

## 2024-06-27 ENCOUNTER — CLINICAL SUPPORT (OUTPATIENT)
Dept: REHABILITATION | Facility: HOSPITAL | Age: 44
End: 2024-06-27
Payer: COMMERCIAL

## 2024-06-27 DIAGNOSIS — M25.551 RIGHT HIP PAIN: Primary | ICD-10-CM

## 2024-06-27 PROCEDURE — 97140 MANUAL THERAPY 1/> REGIONS: CPT

## 2024-06-27 PROCEDURE — 97112 NEUROMUSCULAR REEDUCATION: CPT

## 2024-06-27 NOTE — PROGRESS NOTES
"[facility]   Physical Therapy Treatment Note      Name: Kary Marquez  Clinic Number: [id number]    Therapy Diagnosis:   Encounter Diagnosis   Name Primary?    Right hip pain Yes     Physician: Kamla Moreira MD    Visit Date: 6/27/2024    Physician: Kamla Moreira MD     Physician Orders: PT Eval and Treat             Medical Diagnosis from Referral: Right hip pain  Evaluation Date: 5/28/2024  Authorization Period Expiration: 05/29/2024 - 12/31/2024  Plan of Care Expiration: 8/28/24  Progress Note Due: 6/28/24  Visit # / Visits authorized: 4 / 20     FOTO: 22%  FOTO 2:   FOTO 3:  DC FOTO @: 56%        Precautions: Standard     PTA Visit #: 0/5     Time In: 400 pm   Time Out: 455 pm   Total Billable Time: 55 minutes    Subjective     Pt reports: 6/10 today. She was having some extra right knee pain that resolved after she eliminated the isometrics.    She was compliant with home exercise program.  Response to previous treatment: no worse  Functional change: no worsening     Pain: 6/10  Location: R hip    Objective      Objective Measures updated at progress report unless specified.     Hip Range of Motion:    Right active Left active    Flexion 98 122   Abduction 65 65   Extension 20 20   Ext. Rotation 40 26   Int. Rotation 19 32       Treatment     Kary received the treatments listed below:      Kary participated in neuromuscular re-education activities to improve: Balance, Coordination, Kinesthetic, Sense, Proprioception and Posture for 40 minutes. The following activities were included:  [place] hip hinge kneeling 20x  Bird dog 2 x 20   QP Hip hike 3 x 15  FALL OUT UE/E 2 x 20  ER clam BTB 3" 3 x 10  Isometrics hip abd ball against wall 5" x 10 each    Not Today:   Isometric seated IR 3" x 30  Isometric IR/ER 3" orange belt 3 x 8  B hip add/abd ball and band 3" x 40  Weighted R hip abd 3# 3 x 10  R Bentover hip ext 3 x 10    Kary received the following " manual therapy techniques: Joint mobilizations, Manual traction, Myofacial release, Soft tissue Mobilization, Friction Massage and Functional Dry Needling were applied for 15 minutes, including:  Long and short axis distraction of R hip  Lateral / inferior glides of R hip      Patient Education and Home Exercises       Education provided:   - continue HEP     Written Home Exercises Provided: Patient instructed to cont prior HEP. Exercises were reviewed and Kary was able to demonstrate them prior to the end of the session.  Kary demonstrated good  understanding of the education provided. See EMR under Patient Instructions for exercises provided during therapy sessions    Assessment     Continued with core and hip isolation exercises that are both pain-free and feel productive to patient. Added to HEP. Assess response next visit.    Kary Is progressing well towards her goals.   Pt prognosis is Fair.     Pt will continue to benefit from skilled outpatient physical therapy to address the deficits listed in the problem list box on initial evaluation, provide pt/family education and to maximize pt's level of independence in the home and community environment.     Pt's spiritual, cultural and educational needs considered and pt agreeable to plan of care and goals.     Anticipated barriers to physical therapy: none    Goals:   Short Term Goals:  4 weeks  1.Report decreased R hip pain  < / =  8/10  to increase tolerance for adls  2. Increase ROM by R HIP degrees where limited in order to perform ADLs without difficulty.  3. Increase strength by 1/3 MMT grade in R HIP  to increase tolerance for ADL and work activities.  4. Pt to tolerate HEP to improve ROM and independence with ADL's     Long Term Goals: 8 weeks  1.Report decreased R HIP pain < / = 2/10  to increase tolerance for ADLS  2.Patient goal: walk pain-free  3.Increase strength to 4+/5 in  RLE  to increase tolerance for ADL and work activities.  4. Pt  will report at CJ level (20-40% impaired) on LEFS  to demonstrate increase in LE function with every day tasks.     Plan     Continue with PT DIANA Damon, PT

## 2024-07-03 ENCOUNTER — CLINICAL SUPPORT (OUTPATIENT)
Dept: REHABILITATION | Facility: HOSPITAL | Age: 44
End: 2024-07-03
Payer: COMMERCIAL

## 2024-07-03 DIAGNOSIS — M25.551 RIGHT HIP PAIN: Primary | ICD-10-CM

## 2024-07-03 PROCEDURE — 97112 NEUROMUSCULAR REEDUCATION: CPT

## 2024-07-03 PROCEDURE — 97530 THERAPEUTIC ACTIVITIES: CPT

## 2024-07-03 PROCEDURE — 97140 MANUAL THERAPY 1/> REGIONS: CPT

## 2024-07-03 NOTE — PROGRESS NOTES
"[facility]   Physical Therapy Treatment Note      Name: Kary Marquez  Clinic Number: [id number]    Therapy Diagnosis:   No diagnosis found.    Physician: Kamla Moreira MD    Visit Date: 7/3/2024    Physician: Kamla Moreira MD     Physician Orders: PT Eval and Treat             Medical Diagnosis from Referral: Right hip pain  Evaluation Date: 5/28/2024  Authorization Period Expiration: 05/29/2024 - 12/31/2024  Plan of Care Expiration: 8/28/24  Progress Note Due: 6/28/24  Visit # / Visits authorized: 5 / 20     FOTO: 22%  FOTO 2:   FOTO 3:  DC FOTO @: 56%        Precautions: Standard     PTA Visit #: 0/5     Time In: 800 pm   Time Out: 900 pm   Total Billable Time: 60 minutes    Subjective     Pt reports: 1/10 this morning. No adverse symptoms to last session    She was compliant with home exercise program.  Response to previous treatment: no worse  Functional change: no worsening     Pain: 1/10  Location: R hip    Objective      Objective Measures updated at progress report unless specified.     Hip Range of Motion:    Right active Left active    Flexion 98 122   Abduction 65 65   Extension 20 20   Ext. Rotation 40 26   Int. Rotation 19 32       Treatment     Kary received the treatments listed below:      Kary participated in neuromuscular re-education activities to improve: Balance, Coordination, Kinesthetic, Sense, Proprioception and Posture for 30 minutes. The following activities were included:  [employer] hip hinge kneeling 20x  Bird dog 2 x 20   QP Hip hike 3 x 15  FALL OUT UE/E 2 x 20   Side plank ER GTB 3" 3 x 10  B Hip IR GTB/ball 3" 3 x 10  Banded bridge 3" x 30 GTB   PRONE SWIMMERS 2 x 20  Weighted R hip abd 3# 3 x 10    Kary participated in dynamic functional therapeutic activities to improve functional performance for 15 minutes, including:  Shuttle squats 75# 4 x 8   Lateral walks GTB @ ankles 3 x 10    Not Today:   Isometric seated IR 3" x " [facility] Progress Note    Subjective:     MARIETTA Arora has been doing well and feeding well. Objective:     Estimated Gestational Age: Gestational Age: 36w7d    Weight: 2.41 kg(Filed from Delivery Summary)      Intake and Output:    No intake/output data recorded. No intake/output data recorded. Patient Vitals for the past 24 hrs:   Urine Occurrence(s)   19 1     Patient Vitals for the past 24 hrs:   Stool Occurrence(s)   19 1              Pulse 135, temperature 98.1 °F (36.7 °C), resp. rate 44, height 0.47 m, weight 2.41 kg, head circumference 31.5 cm. Physical Exam:    General: healthy-appearing, vigorous infant. Strong cry. Head: sutures lines are open,fontanelles soft, flat and open  Eyes: sclerae white, pupils equal and reactive, red reflex normal bilaterally  Ears: well-positioned, well-formed pinnae  Nose: clear, normal mucosa  Mouth: Normal tongue, palate intact,  Neck: normal structure  Chest: lungs clear to auscultation, unlabored breathing, no clavicular crepitus  Heart: RRR, S1 S2, no murmurs  Abd: Soft, non-tender, no masses, no HSM, nondistended, umbilical stump clean and dry  Pulses: strong equal femoral pulses, brisk capillary refill  Hips: Negative Cagle, Ortolani, gluteal creases equal  : Normal genitalia  Extremities: well-perfused, warm and dry  Neuro: easily aroused  Good symmetric tone and strength  Positive root and suck.   Symmetric normal reflexes  Skin: warm and pink    Labs:    Recent Results (from the past 24 hour(s))   GLUCOSE, POC    Collection Time: 19 11:34 PM   Result Value Ref Range    Glucose (POC) 36 (LL) 50 - 110 mg/dL    Performed by Itz Sommer, POC    Collection Time: 19  3:23 AM   Result Value Ref Range    Glucose (POC) 35 (LL) 50 - 110 mg/dL    Performed by Marck PÉREZ    GLUCOSE, POC    Collection Time: 19  7:07 AM   Result Value Ref Range    Glucose (POC) 38 (LL) 50 - 110 mg/dL "30  Isometric IR/ER 3" orange belt 3 x 8  B hip add/abd ball and band 3" x 40  R Bentover hip ext 3 x 10    Kary received the following manual therapy techniques: Joint mobilizations, Manual traction, Myofacial release, Soft tissue Mobilization, Friction Massage and Functional Dry Needling were applied for 15 minutes, including:  Long and short axis distraction of R hip  Lateral / inferior glides of R hip      Patient Education and Home Exercises       Education provided:   - continue HEP     Written Home Exercises Provided: Patient instructed to cont prior HEP. Exercises were reviewed and Kary was able to demonstrate them prior to the end of the session.  Kary demonstrated good  understanding of the education provided. See EMR under Patient Instructions for exercises provided during therapy sessions    Assessment     Pt presented with low level pain (this may have something to do with it being a morning session. Regardless, she tolerated all progressions without increased symptoms. Continued with core and hip isolation exercises that are both pain-free and feel productive to patient. Assess response next visit.    Kary Is progressing well towards her goals.   Pt prognosis is Fair.     Pt will continue to benefit from skilled outpatient physical therapy to address the deficits listed in the problem list box on initial evaluation, provide pt/family education and to maximize pt's level of independence in the home and community environment.     Pt's spiritual, cultural and educational needs considered and pt agreeable to plan of care and goals.     Anticipated barriers to physical therapy: none    Goals:   Short Term Goals:  4 weeks  1.Report decreased R hip pain  < / =  8/10  to increase tolerance for adls  2. Increase ROM by R HIP degrees where limited in order to perform ADLs without difficulty.  3. Increase strength by 1/3 MMT grade in R HIP  to increase tolerance for ADL and work " Performed by Chasity PÉREZ        Assessment:     Patient Active Problem List   Diagnosis Code    Liveborn infant by  delivery Z38.01     infant P80.30    Breech birth O27. 1XX0    Small for dates infant P0.11       Plan:     Continue routine care.     Signed By:  Ana Coffey MD     2019 activities.  4. Pt to tolerate HEP to improve ROM and independence with ADL's     Long Term Goals: 8 weeks  1.Report decreased R HIP pain < / = 2/10  to increase tolerance for ADLS  2.Patient goal: walk pain-free  3.Increase strength to 4+/5 in  RLE  to increase tolerance for ADL and work activities.  4. Pt will report at CJ level (20-40% impaired) on LEFS  to demonstrate increase in LE function with every day tasks.     Plan     Continue with PT DIANA Damon PT

## 2024-07-12 ENCOUNTER — CLINICAL SUPPORT (OUTPATIENT)
Dept: REHABILITATION | Facility: HOSPITAL | Age: 44
End: 2024-07-12
Payer: COMMERCIAL

## 2024-07-12 DIAGNOSIS — M25.551 RIGHT HIP PAIN: Primary | ICD-10-CM

## 2024-07-12 PROCEDURE — 97140 MANUAL THERAPY 1/> REGIONS: CPT

## 2024-07-12 PROCEDURE — 97112 NEUROMUSCULAR REEDUCATION: CPT

## 2024-07-12 PROCEDURE — 97530 THERAPEUTIC ACTIVITIES: CPT

## 2024-07-12 NOTE — PROGRESS NOTES
"[facility]   Physical Therapy Treatment Note      Name: Kary Marquez  Clinic Number: [id number]    Therapy Diagnosis:   No diagnosis found.    Physician: Kamla Moreira MD    Visit Date: 7/12/2024    Physician: Kamla Moreira MD     Physician Orders: PT Eval and Treat             Medical Diagnosis from Referral: Right hip pain  Evaluation Date: 5/28/2024  Authorization Period Expiration: 05/29/2024 - 12/31/2024  Plan of Care Expiration: 8/28/24  Progress Note Due: 6/28/24  Visit # / Visits authorized: 6 / 20     FOTO: 22%  FOTO 2:   FOTO 3:   DC FOTO @: 56%        Precautions: Standard     PTA Visit #: 0/5     Time In: 1250 pm   Time Out: 145 pm   Total Billable Time: 55 minutes    Subjective     Pt reports: 5/10 this morning. Feels like it is better, can now put on her shoes and socks.    She was compliant with home exercise program.  Response to previous treatment: no worse  Functional change: no worsening     Pain: 1/10  Location: R hip    Objective      Objective Measures updated at progress report unless specified.     Hip Range of Motion:    Right active Left active    Flexion 98 122   Abduction 65 65   Extension 20 20   Ext. Rotation 40 26   Int. Rotation 19 32       Treatment     Kary received the treatments listed below:      Kary participated in neuromuscular re-education activities to improve: Balance, Coordination, Kinesthetic, Sense, Proprioception and Posture for 25 minutes. The following activities were included:  B Hip IR GTB/ball 3" 3 x 10  Banded bridge 3" x 30 BTB   Donkey kicks 3 x 10  Hydrants 3 x 10    Not Today:   Bird dog 2 x 20   QP Hip hike 3 x 15  FALL OUT UE/E 2 x 20   Side plank ER GTB 3" 3 x 10  PRONE SWIMMERS 2 x 20  Weighted R hip abd 3# 3 x 10    Kary participated in dynamic functional therapeutic activities to improve functional performance for 20 minutes, including:  Shuttle squats 75# 4 x 8   Lateral shuttle 25# 4 x 8 " "      Not Today:   Isometric seated IR 3" x 30  Isometric IR/ER 3" orange belt 3 x 8  B hip add/abd ball and band 3" x 40  R Bentover hip ext 3 x 10    Kary received the following manual therapy techniques: Joint mobilizations, Manual traction, Myofacial release, Soft tissue Mobilization, Friction Massage and Functional Dry Needling were applied for 15 minutes, including:  Long and short axis distraction of R hip  Lateral / inferior glides of R hip      Patient Education and Home Exercises       Education provided:   - continue HEP     Written Home Exercises Provided: Patient instructed to cont prior HEP. Exercises were reviewed and Kary was able to demonstrate them prior to the end of the session.  Kary demonstrated good  understanding of the education provided. See EMR under Patient Instructions for exercises provided during therapy sessions    Assessment     Pt progressing and in less pain overall. Going out of town and returns July 29th.    Kary Is progressing well towards her goals.   Pt prognosis is Fair.     Pt will continue to benefit from skilled outpatient physical therapy to address the deficits listed in the problem list box on initial evaluation, provide pt/family education and to maximize pt's level of independence in the home and community environment.     Pt's spiritual, cultural and educational needs considered and pt agreeable to plan of care and goals.     Anticipated barriers to physical therapy: none    Goals:   Short Term Goals:  4 weeks  1.Report decreased R hip pain  < / =  8/10  to increase tolerance for adls  2. Increase ROM by R HIP degrees where limited in order to perform ADLs without difficulty.  3. Increase strength by 1/3 MMT grade in R HIP  to increase tolerance for ADL and work activities.  4. Pt to tolerate HEP to improve ROM and independence with ADL's     Long Term Goals: 8 weeks  1.Report decreased R HIP pain < / = 2/10  to increase tolerance for " ADLS  2.Patient goal: walk pain-free  3.Increase strength to 4+/5 in  RLE  to increase tolerance for ADL and work activities.  4. Pt will report at CJ level (20-40% impaired) on LEFS  to demonstrate increase in LE function with every day tasks.     Plan     Continue with PT DIANA Damon PT

## 2024-07-29 ENCOUNTER — CLINICAL SUPPORT (OUTPATIENT)
Dept: REHABILITATION | Facility: HOSPITAL | Age: 44
End: 2024-07-29
Payer: COMMERCIAL

## 2024-07-29 DIAGNOSIS — M25.551 RIGHT HIP PAIN: Primary | ICD-10-CM

## 2024-07-29 PROCEDURE — 97112 NEUROMUSCULAR REEDUCATION: CPT

## 2024-07-29 PROCEDURE — 97530 THERAPEUTIC ACTIVITIES: CPT

## 2024-07-29 PROCEDURE — 97140 MANUAL THERAPY 1/> REGIONS: CPT

## 2024-07-29 NOTE — Clinical Note
Nubia Villalta! You see this patient tomorrow at 1115. She is doing so much better painwise, daily. Started at 7/10 and even had no pain last week on vacation. However as soon as she gets back to doing things (work, ADLs) it is a 2/10.  We will continue based on yalls visit tomorrow. She has worked hard. Just wanted to update you before you see her.

## 2024-07-29 NOTE — PROGRESS NOTES
"[facility]   Physical Therapy Treatment Note      Name: Kayr Marquez  Clinic Number: [id number]    Therapy Diagnosis:   Encounter Diagnosis   Name Primary?    Right hip pain Yes       Physician: Kamla Moreira MD    Visit Date: 7/29/2024    Physician: Kamla Moreira MD     Physician Orders: PT Eval and Treat             Medical Diagnosis from Referral: Right hip pain  Evaluation Date: 5/28/2024  Authorization Period Expiration: 05/29/2024 - 12/31/2024  Plan of Care Expiration: 8/28/24  Progress Note Due: 6/28/24  Visit # / Visits authorized: 7 / 20     FOTO: 22%  FOTO 2:   FOTO 3:   DC FOTO @: 56%        Precautions: Standard     PTA Visit #: 0/5     Time In: 700 pm   Time Out: 755 pm   Total Billable Time: 55 minutes    Subjective     Pt reports: no pain on vacation last week at all. Moved offices latter half of last week though and tweaked it a bit so had some pain since then. 2/10 today and sees dr huffman tomorrow.    She was compliant with home exercise program.  Response to previous treatment: no worse  Functional change: no worsening     Pain: 2/10  Location: R hip    Objective      Objective Measures updated at progress report unless specified.     Hip Range of Motion:    Right active Left active    Flexion 98 122   Abduction 65 65   Extension 20 20   Ext. Rotation 40 26   Int. Rotation 19 32       Treatment     Kary received the treatments listed below:      Kary participated in neuromuscular re-education activities to improve: Balance, Coordination, Kinesthetic, Sense, Proprioception and Posture for 31 minutes. The following activities were included:  R hip ER BTB 3" 3 x 10  R side plank hip abd 3 x 10  Banded 2 up 1 down bridge 3" x 30 BTB   Circles in hip abd CW/CCW 6 x 10 each   Circuit RLE:   Hip extension 3 x 10  Donkey kicks 3 x 10  Hydrants 3 x 10      Not Today:   Bird dog 2 x 20   QP Hip hike 3 x 15  FALL OUT UE/E 2 x 20   Side plank ER GTB 3" " 3 x 10  PRONE SWIMMERS 2 x 20  Weighted R hip abd 3# 3 x 10    Kary participated in dynamic functional therapeutic activities to improve functional performance for 12 minutes, including:  SL Shuttle squats 50# 4 x 8   Lateral shuttle 25# 4 x 8       Kary received the following manual therapy techniques: Joint mobilizations, Manual traction, Myofacial release, Soft tissue Mobilization, Friction Massage and Functional Dry Needling were applied for 12 minutes, including:  Long and short axis distraction of R hip  Lateral / inferior glides of R hip      Patient Education and Home Exercises       Education provided:   - continue HEP     Written Home Exercises Provided: Patient instructed to cont prior HEP. Exercises were reviewed and Kary was able to demonstrate them prior to the end of the session.  Kary demonstrated good  understanding of the education provided. See EMR under Patient Instructions for exercises provided during therapy sessions    Assessment     Pt doing significantly better since onset of PT. She even had no pain on vacation. 2/10 pain today and was able to progress hip stability activities.    Kary Is progressing well towards her goals.   Pt prognosis is Fair.     Pt will continue to benefit from skilled outpatient physical therapy to address the deficits listed in the problem list box on initial evaluation, provide pt/family education and to maximize pt's level of independence in the home and community environment.     Pt's spiritual, cultural and educational needs considered and pt agreeable to plan of care and goals.     Anticipated barriers to physical therapy: none    Goals:   Short Term Goals:  4 weeks  1.Report decreased R hip pain  < / =  8/10  to increase tolerance for adls  2. Increase ROM by R HIP degrees where limited in order to perform ADLs without difficulty.  3. Increase strength by 1/3 MMT grade in R HIP  to increase tolerance for ADL and work activities.  4.  Pt to tolerate HEP to improve ROM and independence with ADL's     Long Term Goals: 8 weeks  1.Report decreased R HIP pain < / = 2/10  to increase tolerance for ADLS  2.Patient goal: walk pain-free  3.Increase strength to 4+/5 in  RLE  to increase tolerance for ADL and work activities.  4. Pt will report at CJ level (20-40% impaired) on LEFS  to demonstrate increase in LE function with every day tasks.     Plan     Continue with PT DIANA Damon, PT

## 2024-07-30 ENCOUNTER — PATIENT MESSAGE (OUTPATIENT)
Dept: REHABILITATION | Facility: HOSPITAL | Age: 44
End: 2024-07-30
Payer: COMMERCIAL

## 2024-07-30 ENCOUNTER — OFFICE VISIT (OUTPATIENT)
Dept: SPORTS MEDICINE | Facility: CLINIC | Age: 44
End: 2024-07-30
Payer: COMMERCIAL

## 2024-07-30 VITALS
WEIGHT: 163.25 LBS | HEART RATE: 70 BPM | BODY MASS INDEX: 22.85 KG/M2 | DIASTOLIC BLOOD PRESSURE: 86 MMHG | HEIGHT: 71 IN | SYSTOLIC BLOOD PRESSURE: 124 MMHG

## 2024-07-30 DIAGNOSIS — M25.852 FEMOROACETABULAR IMPINGEMENT OF BOTH HIPS: Primary | ICD-10-CM

## 2024-07-30 DIAGNOSIS — S73.191A ACETABULAR LABRUM TEAR, RIGHT, INITIAL ENCOUNTER: ICD-10-CM

## 2024-07-30 DIAGNOSIS — M25.851 FEMOROACETABULAR IMPINGEMENT OF BOTH HIPS: Primary | ICD-10-CM

## 2024-07-30 PROCEDURE — 1159F MED LIST DOCD IN RCRD: CPT | Mod: CPTII,S$GLB,, | Performed by: STUDENT IN AN ORGANIZED HEALTH CARE EDUCATION/TRAINING PROGRAM

## 2024-07-30 PROCEDURE — 99999 PR PBB SHADOW E&M-EST. PATIENT-LVL III: CPT | Mod: PBBFAC,,, | Performed by: STUDENT IN AN ORGANIZED HEALTH CARE EDUCATION/TRAINING PROGRAM

## 2024-07-30 PROCEDURE — 3079F DIAST BP 80-89 MM HG: CPT | Mod: CPTII,S$GLB,, | Performed by: STUDENT IN AN ORGANIZED HEALTH CARE EDUCATION/TRAINING PROGRAM

## 2024-07-30 PROCEDURE — 3074F SYST BP LT 130 MM HG: CPT | Mod: CPTII,S$GLB,, | Performed by: STUDENT IN AN ORGANIZED HEALTH CARE EDUCATION/TRAINING PROGRAM

## 2024-07-30 PROCEDURE — 1160F RVW MEDS BY RX/DR IN RCRD: CPT | Mod: CPTII,S$GLB,, | Performed by: STUDENT IN AN ORGANIZED HEALTH CARE EDUCATION/TRAINING PROGRAM

## 2024-07-30 PROCEDURE — 3008F BODY MASS INDEX DOCD: CPT | Mod: CPTII,S$GLB,, | Performed by: STUDENT IN AN ORGANIZED HEALTH CARE EDUCATION/TRAINING PROGRAM

## 2024-07-30 PROCEDURE — 99213 OFFICE O/P EST LOW 20 MIN: CPT | Mod: S$GLB,,, | Performed by: STUDENT IN AN ORGANIZED HEALTH CARE EDUCATION/TRAINING PROGRAM

## 2024-07-30 NOTE — PROGRESS NOTES
Subjective:          Chief Complaint: Kary Marquez is a 44 y.o. female who had concerns including Pain of the Left Hip and Pain of the Right Hip.    HPI 7/31/24:  Kary Marquez is a 44 y.o. female returns for follow up evaluation for her bilateral hip pain. She notes that her right is still more symptomatic than the left.  However, she does feel improved since her last visit.  She no longer has pain that reaches a 7/10.  She feels like the progress is slow, however she continues to make improvements.  She does have anterior hip and groin pain.  No longer with severe lateral or posterior pain.  Additionally, she has developed some right-sided knee pain with no known injury or trauma.  This is more with extended periods of activity.      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 She notes that her right is worse then the left.   She notes that her pain started about 6 months ago with no specific injury. She locates her pain as anterior, lateral, and posterior surrounding the hip. She complains of increased pain with activity and long periods of sitting.  She is difficulty getting in and out of a car going from sit to stand position due to the pain.  At times, the pain reaches a 7/10.  She has begun some formal physical therapy and has been to 3 sessions.      History reviewed. No pertinent past medical history.    No current outpatient medications on file prior to visit.     No current facility-administered medications on file prior to visit.       History reviewed. No pertinent surgical history.    No family history on file.    Social History     Socioeconomic History    Marital status:    Tobacco Use    Smoking status: Never    Smokeless tobacco: Never   Social History Narrative    ** Merged History Encounter **          Social Determinants of Health     Financial  Resource Strain: Low Risk  (6/3/2024)    Overall Financial Resource Strain (CARDIA)     Difficulty of Paying Living Expenses: Not hard at all   Food Insecurity: No Food Insecurity (6/3/2024)    Hunger Vital Sign     Worried About Running Out of Food in the Last Year: Never true     Ran Out of Food in the Last Year: Never true   Physical Activity: Insufficiently Active (6/3/2024)    Exercise Vital Sign     Days of Exercise per Week: 5 days     Minutes of Exercise per Session: 20 min   Stress: No Stress Concern Present (6/3/2024)    [employer] - Occupational Stress Questionnaire     Feeling of Stress : Not at all   Housing Stability: Unknown (6/3/2024)    Housing Stability Vital Sign     Unable to Pay for Housing in the Last Year: No       Review of Systems   Constitutional: Negative.   HENT: Negative.     Eyes: Negative.    Cardiovascular: Negative.    Respiratory: Negative.     Endocrine: Negative.    Hematologic/Lymphatic: Negative.    Skin: Negative.    Musculoskeletal:  Positive for joint pain (bilateral hip). Negative for arthritis, falls, muscle cramps, muscle weakness, myalgias and stiffness.   Neurological: Negative.    Psychiatric/Behavioral: Negative.     Allergic/Immunologic: Negative.        Pain Related Questions  Over the past 3 days, what was your average pain during activity? (I.e. running, jogging, walking, climbing stairs, getting dressed, ect.): 4  Over the past 3 days, what was your highest pain level?: 4  Over the past 3 days, what was your lowest pain level? : 2    Other  Was the patient's HEIGHT measured or patient reported?: Patient Reported  Was the patient's WEIGHT measured or patient reported?: Measured      Objective:        General: Kary is well-developed, well-nourished, appears stated age, in no acute distress, alert and oriented to time, place and person.     General    Nursing note and vitals reviewed.  Constitutional: She is oriented to person,  place, and time. She appears well-developed and well-nourished. No distress.   HENT:   Head: Normocephalic and atraumatic.   Nose: Nose normal.   Eyes: EOM are normal.   Cardiovascular:  Intact distal pulses.            Pulmonary/Chest: Effort normal. No respiratory distress.   Neurological: She is alert and oriented to person, place, and time.   Psychiatric: She has a normal mood and affect. Her behavior is normal. Judgment and thought content normal.           Right Knee Exam     Inspection   Alignment:  normal  Effusion: absent    Tenderness   The patient is tender to palpation of the medial joint line.    Range of Motion   Extension:  -5   Flexion:  140     Tests   Meniscus   Freddie:  Medial - negative Lateral - negative  Ligament Examination   Lachman: normal (-1 to 2mm)   PCL-Posterior Drawer: normal (0 to 2mm)     MCL - Valgus: normal (0 to 2mm)  LCL - Varus: normal  Patella   Patellar apprehension: negative  Passive Patellar Tilt: neutral  Patellar Tracking: normal    Other   Sensation: normal    Left Knee Exam     Inspection   Alignment:  normal  Effusion: absent    Range of Motion   Extension:  -5   Flexion:  140     Tests   Meniscus   Freddie:  Medial - negative Lateral - negative  Stability   Lachman: normal (-1 to 2mm)   PCL-Posterior Drawer: normal (0 to 2mm)  MCL - Valgus: normal (0 to 2mm)  LCL - Varus: normal (0 to 2mm)  Patella   Patellar apprehension: negative  Passive Patellar Tilt: neutral  Patellar Tracking: normal    Other   Sensation: normal    Right Hip Exam     Inspection   Scars: absent  Swelling: absent  Bruising: absent  No deformity of hip.  Quadriceps Atrophy:  Negative  Erythema: absent    Range of Motion   Extension:  0   Flexion:  100   External rotation:  60   Internal rotation:  10     Tests   Pain w/ forced internal rotation (KANE): absent  Pain w/ forced external rotation (FADIR): present  Niranjan: negative  Trendelenburg Test: negative  Circumduction test:  negative  Stinchfield test: positive  Log Roll: negative  Snapping Hip (internal): negative  Step-down test: negative    Other   Sensation: normal  Left Hip Exam     Inspection   Scars: absent  Swelling: absent  No deformity of hip.  Quadriceps Atrophy:  negative  Erythema: absent  Bruising: absent    Range of Motion   Extension:  0   Flexion:  110   External rotation:  60   Internal rotation: 20     Tests   Pain w/ forced internal rotation (KANE): absent  Pain w/ forced external rotation (FADIR): absent  Niranjan: negative  Trendelenburg Test: negative  Circumduction test: negative  Stinchfield test: negative  Log Roll: negative    Other   Sensation: normal          Muscle Strength   Right Lower Extremity   Hip Abduction: 5/5   Hip Adduction: 5/5   Hip Flexion: 4/5   Ankle Dorsiflexion:  5/5   Left Lower Extremity   Hip Abduction: 5/5   Hip Adduction: 5/5   Hip Flexion: 5/5   Ankle Dorsiflexion:  5/5     Vascular Exam     Right Pulses  Dorsalis Pedis:      2+  Posterior Tibial:      2+        Left Pulses  Dorsalis Pedis:      2+  Posterior Tibial:      2+        Capillary Refill  Left Hand: normal capillary refill        Edema  Right Upper Leg: absent  Left Upper Leg: absent      Imaging:   X-rays of the bilateral hips from 06/18/2024 personally viewed by me on that day these include AP pelvis, AP right hip, AP left hip, bilateral modified Langford.  Large cam deformities bilaterally as well as pincer impingement with likely os acetabuli.      MR arthrogram of the right hip from 01/26/2024 at an outside institution uploaded and personally viewed by me 06/18/2024.  There is complex tearing of the acetabular labrum on the right hip include the anterior, superior, and posterior aspect.  There is calcification within the anterior and superior aspect of the labrum.  Cam impingement.  Cartilage preserved.        Assessment:     Kary Marquez is a 44 y.o. female With bilateral femoroacetabular impingement and right  labral tear  Encounter Diagnoses   Name Primary?    Femoroacetabular impingement of both hips Yes    Acetabular labrum tear, right, initial encounter             Plan:       She is making good progress with her hips.  We did discuss corticosteroid injections, however her symptoms have improved and she was able to tolerate the physical therapy in the rehabilitation.  We will hold off on this.  Regarding her knee, we will continue to observe this.  Return to clinic in 6-8 weeks.  If she is still symptomatic in the knee, we can consider imaging.

## 2024-08-08 ENCOUNTER — CLINICAL SUPPORT (OUTPATIENT)
Dept: REHABILITATION | Facility: HOSPITAL | Age: 44
End: 2024-08-08
Payer: COMMERCIAL

## 2024-08-08 DIAGNOSIS — M25.551 RIGHT HIP PAIN: Primary | ICD-10-CM

## 2024-08-08 PROCEDURE — 97140 MANUAL THERAPY 1/> REGIONS: CPT

## 2024-08-08 PROCEDURE — 97112 NEUROMUSCULAR REEDUCATION: CPT

## 2024-08-08 PROCEDURE — 97530 THERAPEUTIC ACTIVITIES: CPT

## 2024-08-15 ENCOUNTER — CLINICAL SUPPORT (OUTPATIENT)
Dept: REHABILITATION | Facility: HOSPITAL | Age: 44
End: 2024-08-15
Payer: COMMERCIAL

## 2024-08-15 DIAGNOSIS — M25.551 RIGHT HIP PAIN: Primary | ICD-10-CM

## 2024-08-15 PROCEDURE — 97530 THERAPEUTIC ACTIVITIES: CPT

## 2024-08-15 PROCEDURE — 97112 NEUROMUSCULAR REEDUCATION: CPT

## 2024-08-15 PROCEDURE — 97140 MANUAL THERAPY 1/> REGIONS: CPT

## 2024-08-15 NOTE — PROGRESS NOTES
[facility]   Physical Therapy Treatment Note      Name: Kary Marquez  Clinic Number: [id number]    Therapy Diagnosis:   No diagnosis found.      Physician: Kamla Moreira MD    Visit Date: 8/15/2024    Physician: Kamla Moreira MD     Physician Orders: PT Eval and Treat             Medical Diagnosis from Referral: Right hip pain  Evaluation Date: 5/28/2024  Authorization Period Expiration: 05/29/2024 - 12/31/2024  Plan of Care Expiration: 8/28/24  Progress Note Due: 6/28/24  Visit # / Visits authorized: 9 / 20     FOTO: 22%  FOTO 2:   FOTO 3:   DC FOTO @: 56%        Precautions: Standard     PTA Visit #: 0/5     Time In: 405 pm   Time Out: 458 pm   Total Billable Time: 53 minutes    Subjective     Pt reports: mild pain today not sure what set it off. New exercises seemed to be good. States she wants to run at least 1 mile. Inquires about doing this    She was compliant with home exercise program.  Response to previous treatment: no worse  Functional change: no worsening     Pain: 3/10  Location: R hip    Objective      Objective Measures updated at progress report unless specified.     Hip Range of Motion:    Right active Left active    Flexion 98 122   Abduction 65 65   Extension 20 20   Ext. Rotation 40 26   Int. Rotation 19 32       Treatment     Kary received the treatments listed below:      Kary participated in neuromuscular re-education activities to improve: Balance, Coordination, Kinesthetic, Sense, Proprioception and Posture for 23 minutes. The following activities were included:  Circles in hip abd CW/CCW 6 x 10 each   EOM SLR 3 x 8    Kary participated in dynamic functional therapeutic activities to improve functional performance for 15 minutes, including:  B Reformer hip abd/add 3 x 20  Alter G 80% jog    Kary received the following manual therapy techniques: Joint mobilizations, Manual traction, Myofacial release, Soft tissue  Mobilization, Friction Massage and Functional Dry Needling were applied for 15 minutes, including:  Long and short axis distraction of R hip  Lateral / inferior glides of R hip      Patient Education and Home Exercises       Education provided:   - continue HEP     Written Home Exercises Provided: Patient instructed to cont prior HEP. Exercises were reviewed and Kary was able to demonstrate them prior to the end of the session.  Kary demonstrated good  understanding of the education provided. See EMR under Patient Instructions for exercises provided during therapy sessions    Assessment     Attempted unweighted jog but patient had symptoms < 5 min. Will bring HEP to go through list of what to do . Ideally pain-free days with ADLs prior to impact/load of jogging - pt understands.    Kary Is progressing well towards her goals.   Pt prognosis is Fair.     Pt will continue to benefit from skilled outpatient physical therapy to address the deficits listed in the problem list box on initial evaluation, provide pt/family education and to maximize pt's level of independence in the home and community environment.     Pt's spiritual, cultural and educational needs considered and pt agreeable to plan of care and goals.     Anticipated barriers to physical therapy: none    Goals:   Short Term Goals:  4 weeks - MET   1.Report decreased R hip pain  < / =  8/10  to increase tolerance for adls  2. Increase ROM by R HIP degrees where limited in order to perform ADLs without difficulty.  3. Increase strength by 1/3 MMT grade in R HIP  to increase tolerance for ADL and work activities.  4. Pt to tolerate HEP to improve ROM and independence with ADL's     Long Term Goals: 8 weeks  1.Report decreased R HIP pain < / = 2/10  to increase tolerance for ADLS  2.Patient goal: walk pain-free  3.Increase strength to 4+/5 in  RLE  to increase tolerance for ADL and work activities.  4. Pt will report at CJ level (20-40% impaired)  on LEFS  to demonstrate increase in LE function with every day tasks.     Plan     Continue with PT POC    Nan Damon PT

## 2024-08-21 ENCOUNTER — CLINICAL SUPPORT (OUTPATIENT)
Dept: REHABILITATION | Facility: HOSPITAL | Age: 44
End: 2024-08-21
Payer: COMMERCIAL

## 2024-08-21 DIAGNOSIS — M25.551 RIGHT HIP PAIN: Primary | ICD-10-CM

## 2024-08-21 PROCEDURE — 97140 MANUAL THERAPY 1/> REGIONS: CPT

## 2024-08-21 PROCEDURE — 97112 NEUROMUSCULAR REEDUCATION: CPT

## 2024-08-21 NOTE — PROGRESS NOTES
"  [facility]   Physical Therapy Treatment Note      Name: Kary Valerio Jimmy  Clinic Number: [id number]    Therapy Diagnosis:   Encounter Diagnosis   Name Primary?    Right hip pain Yes         Physician: Kamla Moreira MD    Visit Date: 8/21/2024    Physician: Kamla Moreira MD     Physician Orders: PT Eval and Treat             Medical Diagnosis from Referral: Right hip pain  Evaluation Date: 5/28/2024  Authorization Period Expiration: 05/29/2024 - 12/31/2024  Plan of Care Expiration: 8/28/24  Progress Note Due: 6/28/24  Visit # / Visits authorized: 9 / 20     FOTO: 22%  FOTO 2:   FOTO 3:   DC FOTO @: 56%        Precautions: Standard     PTA Visit #: 0/5     Time In: 805 am  Time Out: 900 am  Total Billable Time: 55 minutes    Subjective     Pt reports: knee was hurting more than usual. Consents and in agreement to dry needling    She was compliant with home exercise program.  Response to previous treatment: no worse  Functional change: no worsening     Pain: 3/10  Location: R hip    Objective      Objective Measures updated at progress report unless specified.     Hip Range of Motion:    Right active Left active    Flexion 98 122   Abduction 65 65   Extension 20 20   Ext. Rotation 40 26   Int. Rotation 19 32     Palpation: creaking on R knee; tender along IT B    (+) Freddie's (click not pain)  (+) Niranjan's    Treatment     Kary received the treatments listed below:      Kary participated in neuromuscular re-education activities to improve: Balance, Coordination, Kinesthetic, Sense, Proprioception and Posture for 40 minutes. The following activities were included:  Circles in hip abd CW/CCW 6 x 10 each   Glute sets 5" x 20  S/L hip abd at wall 2 x 20  S/L hip abd w/ IR/ER 2 x 20  Clams GTB 3 x 12  FDN R glute med/max w/ ESTIM      Kary received the following manual therapy techniques: Joint mobilizations, Manual traction, Myofacial release, Soft tissue " Mobilization, Friction Massage and Functional Dry Needling were applied for 15 minutes, including:  Long and short axis distraction of R hip  Lateral / inferior glides of R hip      Patient Education and Home Exercises       Education provided:   - continue HEP     Written Home Exercises Provided: Patient instructed to cont prior HEP. Exercises were reviewed and Kary was able to demonstrate them prior to the end of the session.  Kary demonstrated good  understanding of the education provided. See EMR under Patient Instructions for exercises provided during therapy sessions    Assessment     Pt presented with knee pain today and is demonstrating some patellar tracking whether it is secondary from hip or primary. Addressed with dry needling followed with hip abd / glute training. Assess response next visit.     Kary Is progressing well towards her goals.   Pt prognosis is Fair.     Pt will continue to benefit from skilled outpatient physical therapy to address the deficits listed in the problem list box on initial evaluation, provide pt/family education and to maximize pt's level of independence in the home and community environment.     Pt's spiritual, cultural and educational needs considered and pt agreeable to plan of care and goals.     Anticipated barriers to physical therapy: none    Goals:   Short Term Goals:  4 weeks - MET   1.Report decreased R hip pain  < / =  8/10  to increase tolerance for adls  2. Increase ROM by R HIP degrees where limited in order to perform ADLs without difficulty.  3. Increase strength by 1/3 MMT grade in R HIP  to increase tolerance for ADL and work activities.  4. Pt to tolerate HEP to improve ROM and independence with ADL's     Long Term Goals: 8 weeks  1.Report decreased R HIP pain < / = 2/10  to increase tolerance for ADLS  2.Patient goal: walk pain-free  3.Increase strength to 4+/5 in  RLE  to increase tolerance for ADL and work activities.  4. Pt will report at  CJ level (20-40% impaired) on LEFS  to demonstrate increase in LE function with every day tasks.     Plan     Continue with PT DIANA Damon PT

## 2024-08-26 ENCOUNTER — CLINICAL SUPPORT (OUTPATIENT)
Dept: REHABILITATION | Facility: HOSPITAL | Age: 44
End: 2024-08-26
Payer: COMMERCIAL

## 2024-08-26 DIAGNOSIS — M25.551 RIGHT HIP PAIN: Primary | ICD-10-CM

## 2024-08-26 PROCEDURE — 97530 THERAPEUTIC ACTIVITIES: CPT

## 2024-08-26 PROCEDURE — 97112 NEUROMUSCULAR REEDUCATION: CPT

## 2024-08-26 NOTE — PROGRESS NOTES
[facility]   Physical Therapy Treatment Note      Name: Kary Marquez  Clinic Number: [id number]    Therapy Diagnosis:   Encounter Diagnosis   Name Primary?    Right hip pain Yes         Physician: Kamla Moreira MD    Visit Date: 8/26/2024    Physician: Kamla Moreira MD     Physician Orders: PT Eval and Treat             Medical Diagnosis from Referral: Right hip pain  Evaluation Date: 5/28/2024  Authorization Period Expiration: 05/29/2024 - 12/31/2024  Plan of Care Expiration: 8/28/24  Progress Note Due: 6/28/24  Visit # / Visits authorized: 11 / 20     FOTO: 22%  FOTO 2:   FOTO 3:   DC FOTO @: 56%        Precautions: Standard     PTA Visit #: 0/5     Time In: 700 am  Time Out: 800 am  Total Billable Time: 60 minutes    Subjective     Pt reports: knee is super painful - did a lot of yard and house work with squatting so may have flared it up. No improvements with dry needling. Feels very unsteady.    She was compliant with home exercise program.  Response to previous treatment: no worse  Functional change: no worsening     Pain: 3/10  Location: R KNEE    Objective      Objective Measures updated at progress report unless specified.     Hip Range of Motion:    Right active Left active    Flexion 98 122   Abduction 65 65   Extension 20 20   Ext. Rotation 40 26   Int. Rotation 19 32     Palpation: creaking on R knee; tender along IT B    Meniscus Cluster Testing:  (+) Pain or audible click with Freddie's  (+) Pain with forced hyperextension   (+) Pain with maximal passive knee flexion   (-) Joint line tenderness  (-) History of catching or locking reported by the patient    (+) Freddie's (click not pain)  (+) Niranjan's    Treatment     Kary received the treatments listed below:      Kary participated in neuromuscular re-education activities to improve: Balance, Coordination, Kinesthetic, Sense, Proprioception and Posture for 50 minutes. The following activities  "were included:    2 to 1 bridge on bosu 4 x 8   B Standing 3# hip abd 4 x 10  R S/L IR 3# 3" 3 x 10  Bentover hip extension 3# 3 x 10  R Hip hikes 2" 4 x 10    Kary participated in dynamic functional therapeutic activities to improve functional performance for 10  minutes, including:  Lateral Walks GTB @ ankles 3 laps <>   R wall clams 3 x 10    Patient Education and Home Exercises       Education provided:   - continue HEP     Written Home Exercises Provided: Patient instructed to cont prior HEP. Exercises were reviewed and Kary was able to demonstrate them prior to the end of the session.  Kary demonstrated good  understanding of the education provided. See EMR under Patient Instructions for exercises provided during therapy sessions    Assessment     Pt presented with knee pain today and is demonstrating some patellar tracking whether it is secondary from hip or primary.  Will reach out to Dr. Villalta about new symptoms.    Kary Is progressing well towards her goals.   Pt prognosis is Fair.     Pt will continue to benefit from skilled outpatient physical therapy to address the deficits listed in the problem list box on initial evaluation, provide pt/family education and to maximize pt's level of independence in the home and community environment.     Pt's spiritual, cultural and educational needs considered and pt agreeable to plan of care and goals.     Anticipated barriers to physical therapy: none    Goals:   Short Term Goals:  4 weeks - MET   1.Report decreased R hip pain  < / =  8/10  to increase tolerance for adls  2. Increase ROM by R HIP degrees where limited in order to perform ADLs without difficulty.  3. Increase strength by 1/3 MMT grade in R HIP  to increase tolerance for ADL and work activities.  4. Pt to tolerate HEP to improve ROM and independence with ADL's     Long Term Goals: 8 weeks  1.Report decreased R HIP pain < / = 2/10  to increase tolerance for ADLS  2.Patient goal: " walk pain-free  3.Increase strength to 4+/5 in  RLE  to increase tolerance for ADL and work activities.  4. Pt will report at CJ level (20-40% impaired) on LEFS  to demonstrate increase in LE function with every day tasks.     Plan     Continue with PT DIANA Damon PT

## 2024-08-26 NOTE — Clinical Note
Nubia Villalta,   This patient is doing well hip wise but she is all of a sudden having a ton of patellar tracking type knee pain that could be IT Band related because very tender along IT band and into glutes. Meniscus tests are positive but pain is really behind knee cap and getting that creaking with patellar glides and tibial IR/ER. I really think its patellafem tracking secondary to hip weakness. It is warm and feels inflamed so not sure if an injection could help but I was wondering if you wanted to get her in sooner to take a look. She is going out of town Thursday until next week.